# Patient Record
Sex: FEMALE | Race: OTHER
[De-identification: names, ages, dates, MRNs, and addresses within clinical notes are randomized per-mention and may not be internally consistent; named-entity substitution may affect disease eponyms.]

---

## 2017-05-03 ENCOUNTER — HOSPITAL ENCOUNTER (OUTPATIENT)
Dept: HOSPITAL 15 - RAD HDHVI | Age: 72
Discharge: HOME | End: 2017-05-03
Attending: INTERNAL MEDICINE
Payer: COMMERCIAL

## 2017-05-03 VITALS — WEIGHT: 197 LBS | HEIGHT: 63 IN | BODY MASS INDEX: 34.91 KG/M2

## 2017-05-03 DIAGNOSIS — E11.9: ICD-10-CM

## 2017-05-03 DIAGNOSIS — I10: Primary | ICD-10-CM

## 2017-05-03 DIAGNOSIS — E78.00: ICD-10-CM

## 2017-05-03 PROCEDURE — 96374 THER/PROPH/DIAG INJ IV PUSH: CPT

## 2017-05-03 PROCEDURE — 78452 HT MUSCLE IMAGE SPECT MULT: CPT

## 2017-05-03 PROCEDURE — 96375 TX/PRO/DX INJ NEW DRUG ADDON: CPT

## 2017-05-03 PROCEDURE — 93306 TTE W/DOPPLER COMPLETE: CPT

## 2017-05-03 PROCEDURE — 93005 ELECTROCARDIOGRAM TRACING: CPT

## 2017-05-10 ENCOUNTER — HOSPITAL ENCOUNTER (OUTPATIENT)
Dept: HOSPITAL 15 - RAD HDHVI | Age: 72
Discharge: HOME | End: 2017-05-10
Attending: INTERNAL MEDICINE
Payer: COMMERCIAL

## 2017-05-10 VITALS — WEIGHT: 197 LBS | HEIGHT: 63 IN | BODY MASS INDEX: 34.91 KG/M2

## 2017-05-10 DIAGNOSIS — E78.00: ICD-10-CM

## 2017-05-10 DIAGNOSIS — E11.9: ICD-10-CM

## 2017-05-10 DIAGNOSIS — I10: Primary | ICD-10-CM

## 2017-05-10 PROCEDURE — 93005 ELECTROCARDIOGRAM TRACING: CPT

## 2017-05-10 PROCEDURE — 96375 TX/PRO/DX INJ NEW DRUG ADDON: CPT

## 2017-05-10 PROCEDURE — 96374 THER/PROPH/DIAG INJ IV PUSH: CPT

## 2017-05-10 PROCEDURE — 78452 HT MUSCLE IMAGE SPECT MULT: CPT

## 2018-06-25 ENCOUNTER — HOSPITAL ENCOUNTER (OUTPATIENT)
Dept: HOSPITAL 15 - RAD HDHVI | Age: 73
Discharge: HOME | End: 2018-06-25
Attending: INTERNAL MEDICINE
Payer: COMMERCIAL

## 2018-06-25 DIAGNOSIS — E78.00: ICD-10-CM

## 2018-06-25 DIAGNOSIS — I10: Primary | ICD-10-CM

## 2018-06-25 DIAGNOSIS — E11.9: ICD-10-CM

## 2018-06-25 DIAGNOSIS — I20.0: ICD-10-CM

## 2018-06-25 PROCEDURE — 78452 HT MUSCLE IMAGE SPECT MULT: CPT

## 2018-06-25 PROCEDURE — 96374 THER/PROPH/DIAG INJ IV PUSH: CPT

## 2018-06-25 PROCEDURE — 93017 CV STRESS TEST TRACING ONLY: CPT

## 2018-11-30 ENCOUNTER — HOSPITAL ENCOUNTER (OUTPATIENT)
Dept: HOSPITAL 15 - RAD HDHVI | Age: 73
Discharge: HOME | End: 2018-11-30
Attending: INTERNAL MEDICINE
Payer: COMMERCIAL

## 2018-11-30 VITALS — DIASTOLIC BLOOD PRESSURE: 54 MMHG | SYSTOLIC BLOOD PRESSURE: 152 MMHG

## 2018-11-30 VITALS — DIASTOLIC BLOOD PRESSURE: 50 MMHG | SYSTOLIC BLOOD PRESSURE: 165 MMHG

## 2018-11-30 DIAGNOSIS — I70.0: ICD-10-CM

## 2018-11-30 DIAGNOSIS — I20.9: ICD-10-CM

## 2018-11-30 DIAGNOSIS — R79.1: ICD-10-CM

## 2018-11-30 DIAGNOSIS — Z01.818: Primary | ICD-10-CM

## 2018-11-30 DIAGNOSIS — D64.9: ICD-10-CM

## 2018-11-30 DIAGNOSIS — E11.9: ICD-10-CM

## 2018-11-30 DIAGNOSIS — I10: ICD-10-CM

## 2018-11-30 LAB
ANION GAP SERPL CALCULATED.3IONS-SCNC: 4 MMOL/L (ref 5–15)
APTT PPP: 25.2 SEC (ref 23.78–33.04)
BUN SERPL-MCNC: 16 MG/DL (ref 7–18)
BUN/CREAT SERPL: 20.8
CALCIUM SERPL-MCNC: 9.2 MG/DL (ref 8.5–10.1)
CHLORIDE SERPL-SCNC: 106 MMOL/L (ref 98–107)
CO2 SERPL-SCNC: 27 MMOL/L (ref 21–32)
GLUCOSE SERPL-MCNC: 143 MG/DL (ref 74–106)
HCT VFR BLD AUTO: 39.6 % (ref 36–46)
HGB BLD-MCNC: 13.1 G/DL (ref 12.2–16.2)
INR PPP: 0.97 (ref 0.9–1.15)
MCH RBC QN AUTO: 29.4 PG (ref 28–32)
MCV RBC AUTO: 89 FL (ref 80–100)
NRBC BLD QL AUTO: 0.1 %
POTASSIUM SERPL-SCNC: 3.6 MMOL/L (ref 3.5–5.1)
PROTHROMBIN TIME: 10.4 SEC (ref 9.27–12.13)
SODIUM SERPL-SCNC: 137 MMOL/L (ref 136–145)

## 2018-11-30 PROCEDURE — 85025 COMPLETE CBC W/AUTO DIFF WBC: CPT

## 2018-11-30 PROCEDURE — 71046 X-RAY EXAM CHEST 2 VIEWS: CPT

## 2018-11-30 PROCEDURE — 36415 COLL VENOUS BLD VENIPUNCTURE: CPT

## 2018-11-30 PROCEDURE — 85610 PROTHROMBIN TIME: CPT

## 2018-11-30 PROCEDURE — 93005 ELECTROCARDIOGRAM TRACING: CPT

## 2018-11-30 PROCEDURE — 80048 BASIC METABOLIC PNL TOTAL CA: CPT

## 2018-11-30 PROCEDURE — 85730 THROMBOPLASTIN TIME PARTIAL: CPT

## 2018-12-04 ENCOUNTER — HOSPITAL ENCOUNTER (OUTPATIENT)
Dept: HOSPITAL 15 - CATH | Age: 73
Discharge: HOME | End: 2018-12-04
Attending: INTERNAL MEDICINE
Payer: COMMERCIAL

## 2018-12-04 VITALS — BODY MASS INDEX: 33.66 KG/M2 | WEIGHT: 190 LBS | HEIGHT: 63 IN

## 2018-12-04 DIAGNOSIS — J43.9: ICD-10-CM

## 2018-12-04 DIAGNOSIS — E66.01: ICD-10-CM

## 2018-12-04 DIAGNOSIS — R94.39: Primary | ICD-10-CM

## 2018-12-04 DIAGNOSIS — R07.9: ICD-10-CM

## 2018-12-04 DIAGNOSIS — I10: ICD-10-CM

## 2018-12-04 DIAGNOSIS — E78.00: ICD-10-CM

## 2018-12-04 DIAGNOSIS — Z88.6: ICD-10-CM

## 2018-12-04 DIAGNOSIS — I73.9: ICD-10-CM

## 2018-12-04 DIAGNOSIS — E78.5: ICD-10-CM

## 2018-12-04 DIAGNOSIS — E11.9: ICD-10-CM

## 2018-12-04 DIAGNOSIS — R06.02: ICD-10-CM

## 2018-12-04 DIAGNOSIS — Z79.899: ICD-10-CM

## 2018-12-04 PROCEDURE — 93458 L HRT ARTERY/VENTRICLE ANGIO: CPT

## 2018-12-04 PROCEDURE — 99152 MOD SED SAME PHYS/QHP 5/>YRS: CPT

## 2020-03-11 ENCOUNTER — HOSPITAL ENCOUNTER (OUTPATIENT)
Dept: HOSPITAL 15 - RAD HDHVI | Age: 75
Discharge: HOME | End: 2020-03-11
Attending: INTERNAL MEDICINE
Payer: COMMERCIAL

## 2020-03-11 DIAGNOSIS — J44.9: ICD-10-CM

## 2020-03-11 DIAGNOSIS — I51.7: ICD-10-CM

## 2020-03-11 DIAGNOSIS — I50.33: Primary | ICD-10-CM

## 2020-03-11 PROCEDURE — 93306 TTE W/DOPPLER COMPLETE: CPT

## 2020-03-19 ENCOUNTER — HOSPITAL ENCOUNTER (OUTPATIENT)
Dept: HOSPITAL 15 - RAD HDHVI | Age: 75
Discharge: HOME | End: 2020-03-19
Attending: INTERNAL MEDICINE
Payer: COMMERCIAL

## 2020-03-19 VITALS — HEIGHT: 63 IN | WEIGHT: 197 LBS | BODY MASS INDEX: 34.91 KG/M2

## 2020-03-19 DIAGNOSIS — E78.00: ICD-10-CM

## 2020-03-19 DIAGNOSIS — Z95.0: ICD-10-CM

## 2020-03-19 DIAGNOSIS — R07.89: ICD-10-CM

## 2020-03-19 DIAGNOSIS — I10: ICD-10-CM

## 2020-03-19 DIAGNOSIS — Z82.49: ICD-10-CM

## 2020-03-19 DIAGNOSIS — I25.2: ICD-10-CM

## 2020-03-19 DIAGNOSIS — E11.9: Primary | ICD-10-CM

## 2020-03-19 PROCEDURE — 78452 HT MUSCLE IMAGE SPECT MULT: CPT

## 2020-03-19 PROCEDURE — 93017 CV STRESS TEST TRACING ONLY: CPT

## 2020-03-19 PROCEDURE — 96374 THER/PROPH/DIAG INJ IV PUSH: CPT

## 2021-06-24 ENCOUNTER — HOSPITAL ENCOUNTER (OUTPATIENT)
Dept: HOSPITAL 15 - RAD HDHVI | Age: 76
Discharge: HOME | End: 2021-06-24
Attending: INTERNAL MEDICINE
Payer: COMMERCIAL

## 2021-06-24 DIAGNOSIS — R07.89: ICD-10-CM

## 2021-06-24 DIAGNOSIS — I10: Primary | ICD-10-CM

## 2021-06-24 PROCEDURE — 93306 TTE W/DOPPLER COMPLETE: CPT

## 2021-07-12 ENCOUNTER — HOSPITAL ENCOUNTER (OUTPATIENT)
Dept: HOSPITAL 15 - RAD HDHVI | Age: 76
Discharge: HOME | End: 2021-07-12
Attending: INTERNAL MEDICINE
Payer: COMMERCIAL

## 2021-07-12 VITALS — HEIGHT: 63 IN | WEIGHT: 190 LBS | BODY MASS INDEX: 33.66 KG/M2

## 2021-07-12 DIAGNOSIS — I10: ICD-10-CM

## 2021-07-12 DIAGNOSIS — Z82.49: ICD-10-CM

## 2021-07-12 DIAGNOSIS — I25.2: ICD-10-CM

## 2021-07-12 DIAGNOSIS — E11.9: ICD-10-CM

## 2021-07-12 DIAGNOSIS — E78.5: ICD-10-CM

## 2021-07-12 DIAGNOSIS — Z95.0: ICD-10-CM

## 2021-07-12 DIAGNOSIS — I25.10: Primary | ICD-10-CM

## 2021-07-12 PROCEDURE — 96375 TX/PRO/DX INJ NEW DRUG ADDON: CPT

## 2021-07-12 PROCEDURE — 96374 THER/PROPH/DIAG INJ IV PUSH: CPT

## 2021-07-12 PROCEDURE — 78452 HT MUSCLE IMAGE SPECT MULT: CPT

## 2021-07-12 PROCEDURE — 93005 ELECTROCARDIOGRAM TRACING: CPT

## 2023-07-03 ENCOUNTER — HOSPITAL ENCOUNTER (OUTPATIENT)
Dept: HOSPITAL 15 - RAD HDHVI | Age: 78
Discharge: HOME | End: 2023-07-03
Attending: INTERNAL MEDICINE
Payer: COMMERCIAL

## 2023-07-03 DIAGNOSIS — I08.3: Primary | ICD-10-CM

## 2023-07-03 DIAGNOSIS — I10: ICD-10-CM

## 2023-07-03 DIAGNOSIS — R07.89: ICD-10-CM

## 2023-07-03 PROCEDURE — 93306 TTE W/DOPPLER COMPLETE: CPT

## 2023-07-24 ENCOUNTER — HOSPITAL ENCOUNTER (OUTPATIENT)
Dept: HOSPITAL 15 - RAD HDHVI | Age: 78
Discharge: HOME | End: 2023-07-24
Attending: INTERNAL MEDICINE
Payer: COMMERCIAL

## 2023-07-24 VITALS — HEIGHT: 64 IN | BODY MASS INDEX: 31.07 KG/M2 | WEIGHT: 182 LBS

## 2023-07-24 DIAGNOSIS — I50.33: ICD-10-CM

## 2023-07-24 DIAGNOSIS — E11.21: ICD-10-CM

## 2023-07-24 DIAGNOSIS — E78.00: ICD-10-CM

## 2023-07-24 DIAGNOSIS — E11.65: ICD-10-CM

## 2023-07-24 DIAGNOSIS — Z82.49: ICD-10-CM

## 2023-07-24 DIAGNOSIS — J96.90: ICD-10-CM

## 2023-07-24 DIAGNOSIS — E11.40: ICD-10-CM

## 2023-07-24 DIAGNOSIS — I11.0: ICD-10-CM

## 2023-07-24 DIAGNOSIS — R07.89: Primary | ICD-10-CM

## 2023-07-24 PROCEDURE — 93005 ELECTROCARDIOGRAM TRACING: CPT

## 2023-07-24 PROCEDURE — 96375 TX/PRO/DX INJ NEW DRUG ADDON: CPT

## 2023-07-24 PROCEDURE — 78452 HT MUSCLE IMAGE SPECT MULT: CPT

## 2023-07-24 PROCEDURE — 96374 THER/PROPH/DIAG INJ IV PUSH: CPT

## 2023-07-31 ENCOUNTER — HOSPITAL ENCOUNTER (OUTPATIENT)
Dept: HOSPITAL 15 - RAD HDHVI | Age: 78
Discharge: HOME | End: 2023-07-31
Attending: INTERNAL MEDICINE
Payer: COMMERCIAL

## 2023-07-31 DIAGNOSIS — E78.5: Primary | ICD-10-CM

## 2023-07-31 PROCEDURE — 93925 LOWER EXTREMITY STUDY: CPT

## 2025-01-28 ENCOUNTER — HOSPITAL ENCOUNTER (OUTPATIENT)
Dept: HOSPITAL 15 - RT | Age: 80
Discharge: HOME | End: 2025-01-28
Attending: INTERNAL MEDICINE
Payer: COMMERCIAL

## 2025-01-28 DIAGNOSIS — R06.09: Primary | ICD-10-CM

## 2025-01-28 DIAGNOSIS — R05.3: ICD-10-CM

## 2025-01-28 PROCEDURE — 94060 EVALUATION OF WHEEZING: CPT

## 2025-01-28 PROCEDURE — 94727 GAS DIL/WSHOT DETER LNG VOL: CPT

## 2025-01-28 PROCEDURE — 94729 DIFFUSING CAPACITY: CPT

## 2025-03-06 LAB
ALBUMIN SERPL-MCNC: 4.6 G/DL (ref 3.2–4.8)
ALP SERPL-CCNC: 124 U/L (ref 46–116)
ALT SERPL-CCNC: 35 U/L (ref 7–40)
ANION GAP SERPL CALCULATED.3IONS-SCNC: 6 MMOL/L (ref 5–15)
APTT PPP: 25.4 SEC (ref 24.5–34.5)
BILIRUB SERPL-MCNC: 0.5 MG/DL (ref 0.2–1)
BUN SERPL-MCNC: 29 MG/DL (ref 9–23)
BUN/CREAT SERPL: 24 (ref 10–20)
CALCIUM SERPL-MCNC: 10.2 MG/DL (ref 8.7–10.4)
CHLORIDE SERPL-SCNC: 101 MMOL/L (ref 98–107)
CO2 SERPL-SCNC: 26 MMOL/L (ref 20–31)
GLUCOSE SERPL-MCNC: 122 MG/DL (ref 74–106)
HCT VFR BLD AUTO: 38.8 % (ref 36–46)
HGB BLD-MCNC: 13.4 G/DL (ref 12.2–16.2)
INR PPP: 1.02 (ref 0.9–1.15)
MCH RBC QN AUTO: 31 PG (ref 28–32)
MCV RBC AUTO: 90 FL (ref 80–100)
NRBC BLD QL AUTO: 0.1 %
POTASSIUM SERPL-SCNC: 4.7 MMOL/L (ref 3.5–5.1)
PROT SERPL-MCNC: 7.8 G/DL (ref 5.7–8.2)
PROTHROMBIN TIME: 10.8 SEC (ref 9.3–11.8)
SODIUM SERPL-SCNC: 133 MMOL/L (ref 136–145)

## 2025-03-10 ENCOUNTER — HOSPITAL ENCOUNTER (OUTPATIENT)
Dept: HOSPITAL 15 - GI | Age: 80
Discharge: HOME | End: 2025-03-10
Attending: INTERNAL MEDICINE
Payer: COMMERCIAL

## 2025-03-10 VITALS — HEART RATE: 81 BPM | TEMPERATURE: 99.1 F | RESPIRATION RATE: 22 BRPM | OXYGEN SATURATION: 100 %

## 2025-03-10 VITALS — HEIGHT: 64 IN | BODY MASS INDEX: 28.51 KG/M2 | WEIGHT: 167 LBS

## 2025-03-10 VITALS
DIASTOLIC BLOOD PRESSURE: 52 MMHG | HEART RATE: 85 BPM | SYSTOLIC BLOOD PRESSURE: 119 MMHG | OXYGEN SATURATION: 99 % | RESPIRATION RATE: 20 BRPM

## 2025-03-10 VITALS — RESPIRATION RATE: 19 BRPM | HEART RATE: 79 BPM | OXYGEN SATURATION: 97 %

## 2025-03-10 DIAGNOSIS — J47.9: ICD-10-CM

## 2025-03-10 DIAGNOSIS — E11.42: ICD-10-CM

## 2025-03-10 DIAGNOSIS — Z98.890: ICD-10-CM

## 2025-03-10 DIAGNOSIS — Z98.41: ICD-10-CM

## 2025-03-10 DIAGNOSIS — Z90.710: ICD-10-CM

## 2025-03-10 DIAGNOSIS — Z79.4: ICD-10-CM

## 2025-03-10 DIAGNOSIS — R91.1: ICD-10-CM

## 2025-03-10 DIAGNOSIS — E11.65: ICD-10-CM

## 2025-03-10 DIAGNOSIS — R05.3: Primary | ICD-10-CM

## 2025-03-10 DIAGNOSIS — Z98.42: ICD-10-CM

## 2025-03-10 DIAGNOSIS — Z79.899: ICD-10-CM

## 2025-03-10 DIAGNOSIS — J84.9: ICD-10-CM

## 2025-03-10 DIAGNOSIS — E78.5: ICD-10-CM

## 2025-03-10 PROCEDURE — 85025 COMPLETE CBC W/AUTO DIFF WBC: CPT

## 2025-03-10 PROCEDURE — 82962 GLUCOSE BLOOD TEST: CPT

## 2025-03-10 PROCEDURE — 71045 X-RAY EXAM CHEST 1 VIEW: CPT

## 2025-03-10 PROCEDURE — 85610 PROTHROMBIN TIME: CPT

## 2025-03-10 PROCEDURE — 31624 DX BRONCHOSCOPE/LAVAGE: CPT

## 2025-03-10 PROCEDURE — 87205 SMEAR GRAM STAIN: CPT

## 2025-03-10 PROCEDURE — 80053 COMPREHEN METABOLIC PANEL: CPT

## 2025-03-10 PROCEDURE — 85730 THROMBOPLASTIN TIME PARTIAL: CPT

## 2025-03-10 PROCEDURE — 36415 COLL VENOUS BLD VENIPUNCTURE: CPT

## 2025-03-10 PROCEDURE — 88305 TISSUE EXAM BY PATHOLOGIST: CPT

## 2025-03-10 PROCEDURE — 88104 CYTOPATH FL NONGYN SMEARS: CPT

## 2025-03-10 PROCEDURE — 87070 CULTURE OTHR SPECIMN AEROBIC: CPT

## 2025-03-10 RX ADMIN — RACEPINEPHRINE HYDROCHLORIDE ONE ML: 11.25 SOLUTION RESPIRATORY (INHALATION) at 10:28

## 2025-03-10 RX ADMIN — Medication ONE MG: at 10:52

## 2025-03-10 RX ADMIN — FENTANYL CITRATE ONE MCG: 50 INJECTION, SOLUTION INTRAMUSCULAR; INTRAVENOUS at 10:52

## 2025-03-10 RX ADMIN — DIPHENHYDRAMINE HYDROCHLORIDE ONE MG: 50 INJECTION INTRAMUSCULAR; INTRAVENOUS at 10:52

## 2025-03-10 RX ADMIN — ALBUTEROL SULFATE ONE MG: 2.5 SOLUTION RESPIRATORY (INHALATION) at 10:28

## 2025-03-10 RX ADMIN — MIDAZOLAM HYDROCHLORIDE ONE MG: 5 INJECTION INTRAMUSCULAR; INTRAVENOUS at 10:52

## 2025-03-10 NOTE — [facility]
CHEST RADIOGRAPH



Indication: s/p bronchoscopy with BAL



Technique: Single frontal view of the chest was obtained



Comparison: None



FINDINGS: 



Lines and Tubes: None



Lungs: No focal consolidation.



Pleura: No effusion.



No pneumothorax. 



Cardiomediastinal contours: Unremarkable



Bones: No acute osseous abnormality.



IMPRESSION:



1. No acute cardiopulmonary disease.



Electronically Signed by: Radha Prasad at 03/10/2025 11:44:00 AM

## 2025-03-10 NOTE — [facility]NC[facility]
Procedure


-


Bronchoscopy procedure note:


Indications: Bronchiectasis r/o MIKE/MAC, Tree in bud opacities on CT chest.





Medicines: Versed 4 mg IVP, Fentanyl 75 mcg, Benadryl 50 mg, Robinol 0.2 mg IVP


Complications: None





Procedure: 


Patient medications and allergies reviewed.  The risks and benefits of the 

procedure and the sedation options and risk were discussed with the patient's 

healthcare proxy.  All questions were answered and informed consent was 

obtained.  Patient identification and proposed procedure were verified prior to 

the procedure by the physician, and a nurse, and the respiratory therapist in 

ICU room.  The heart rate, respiratory rate, oxygen saturations, blood pressure,

adequacy of pulmonary ventilation, and response to care were monitored 

throughout the procedure.  The physical status of the patient was reassessed 

after the procedure.





After obtaining informed consent, the bronchoscope was introduced through the 

endotracheal tube and advanced into the trachea bronchial tree of both lungs.  

The procedure was accomplished without difficulty.  The patient tolerated the 

procedure well.





Findings:


The trachea is in normal caliber. The jay is sharp.  The tracheobronchial 

tree of the right lung was examined to at least the first subsegmental level.  

The bronchial mucosa and anatomy in the right lung are normal.  There are no 

endobronchial lesions. There was scant clear secretions from right main stem 

bronchus onward throughout R1-R10. These were cleared.





Right middle lobe (RML) Bronchoalveolar lavage (BAL) obtained. RML BAL sent for 

gram stain and culture, viral culture, fungal culture, and AFB smear and 

culture.





The left upper lobe, lingula, and left lower lobe were examined to at least the 

first subsegmental level.  Bronchial mucosa and anatomy in the left upper lobe 

and lingula are normal.  There were no endobronchial lesions. There was scant 

clear secretions from left main stem bronchus onward throughout L1-L10.





Lingular bronchoalveolar lavage was obtained.  Lingular BAL will be sent for 

Gram stain and culture, viral culture, fungal culture, and AFB smear and 

culture.





There was no active bleeding at the completion of the procedure.





Estimated blood loss: Less than 5 mL.





Impression: 


Tree in bud opacities on CT chest


RML BAL performed 


Lingular BAL performed


Bronchiectasis r/o MIKE/MAC





Recommendation: Follow-up RML BAL and lingular BAL results.





Procedure codes: 69428, bronchoscopy, rigid and flexible, including fluoroscopic

guidance, one performed; with bronchial endobronchial broncho-alveolar lavage, 

single or multiple sites











TIFFANY PICHARDO MD             Mar 10, 2025 11:05

## 2025-03-10 NOTE — [facility]
Procedure


-


I administered moderate sedation throughout the 9 minutes of the procedure.  An 

independent observer administered medications at my direction and monitored the 

patient's level of consciousness and physiological status throughout the 

procedure.





CPT 44100 for the first 15 minutes.





CONSCIOUS SEDATION 





PROCEDURE NOTE: Procedural Sedation 


Performed by: Dr Payne





Indications: Bronchoscopy with BAL





Universal Protocol: a time out was performed and the correct patient and site 

were verified 





Consent: The risks and benefits of monitored anesthesia care, including the risk

of aspiration, deep sedation requiring airway management including possible 

intubation, nausea/vomiting and the risks of not performing the procedure, 

including severe pain and inability to complete the procedure, were all 

discussed with the patient. The alternatives of performing the procedure, 

including local anesthesia and IV analgesia, also discussed. The patient has a 

ride home available. 





ASA Class: II-mild systemic disease 


Mallampati Score: 2





Pre-anesthesia evaluation, including history, exam, and informed consent is 

documented in the note above. 


 


Monitoring: Continuous monitoring of heart rate, respiratory rate, pulse 

oximetry. Supplemental oxygen prior to and during procedure via nasal cannula. 

Resuscitation equipment available at the bedside during sedation. 





Intra-service start time: 1055


Intra-service stop time: 1104





The patient received Versed 4 mg IV push, fentanyl 75 mcg IV push, 

glycopyrrolate 0.2 mg IV push, Benadryl 50 mg IV push and dosages were recorded 

on the sedation form. The patient was recovered from the sedation without 

complication or incident. Patient returned to pre-sedation level of awareness. 

The monitoring was discontinued at this time.





Post-anesthesia evaluation: 


Respiratory function, cardiovascular function, temperature, and mental status 

did return to pre-anesthetic state. 





Pain was controlled. 





The patient did tolerate p.o.











TIFFANY PAYNE MD             Mar 10, 2025 11:13

## 2025-07-08 ENCOUNTER — HOSPITAL ENCOUNTER (OUTPATIENT)
Dept: HOSPITAL 15 - RAD HDHVI | Age: 80
Discharge: HOME | End: 2025-07-08
Attending: INTERNAL MEDICINE
Payer: COMMERCIAL

## 2025-07-08 DIAGNOSIS — I10: ICD-10-CM

## 2025-07-08 DIAGNOSIS — I34.0: Primary | ICD-10-CM

## 2025-07-08 PROCEDURE — 93306 TTE W/DOPPLER COMPLETE: CPT

## 2025-07-16 ENCOUNTER — HOSPITAL ENCOUNTER (OUTPATIENT)
Dept: HOSPITAL 15 - RAD HDHVI | Age: 80
Discharge: HOME | End: 2025-07-16
Attending: INTERNAL MEDICINE
Payer: COMMERCIAL

## 2025-07-16 VITALS — BODY MASS INDEX: 29.41 KG/M2 | HEIGHT: 63 IN | WEIGHT: 166 LBS

## 2025-07-16 DIAGNOSIS — I49.1: Primary | ICD-10-CM

## 2025-07-16 DIAGNOSIS — Z13.6: ICD-10-CM

## 2025-07-16 DIAGNOSIS — E78.00: ICD-10-CM

## 2025-07-16 DIAGNOSIS — I50.33: ICD-10-CM

## 2025-07-16 DIAGNOSIS — Z88.6: ICD-10-CM

## 2025-07-16 DIAGNOSIS — R00.1: ICD-10-CM

## 2025-07-16 DIAGNOSIS — E11.21: ICD-10-CM

## 2025-07-16 DIAGNOSIS — Z82.49: ICD-10-CM

## 2025-07-16 DIAGNOSIS — J84.9: ICD-10-CM

## 2025-07-16 DIAGNOSIS — I11.0: ICD-10-CM

## 2025-07-16 PROCEDURE — 78452 HT MUSCLE IMAGE SPECT MULT: CPT

## 2025-07-16 PROCEDURE — 93017 CV STRESS TEST TRACING ONLY: CPT
